# Patient Record
Sex: MALE | Race: WHITE | NOT HISPANIC OR LATINO | Employment: OTHER | URBAN - METROPOLITAN AREA
[De-identification: names, ages, dates, MRNs, and addresses within clinical notes are randomized per-mention and may not be internally consistent; named-entity substitution may affect disease eponyms.]

---

## 2017-01-20 ENCOUNTER — HOSPITAL ENCOUNTER (OUTPATIENT)
Dept: RADIOLOGY | Facility: HOSPITAL | Age: 72
Discharge: HOME | End: 2017-01-20
Attending: ORTHOPAEDIC SURGERY
Payer: MEDICARE

## 2017-01-20 DIAGNOSIS — M75.82 TENDINITIS OF LEFT ROTATOR CUFF: ICD-10-CM

## 2017-01-20 DIAGNOSIS — S49.90XA INJURY OF SHOULDER AND UPPER ARM: ICD-10-CM

## 2017-01-20 PROCEDURE — 73222 MRI JOINT UPR EXTREM W/DYE: CPT | Mod: 26,LT,, | Performed by: RADIOLOGY

## 2017-01-20 PROCEDURE — A9585 GADOBUTROL INJECTION: HCPCS | Performed by: ORTHOPAEDIC SURGERY

## 2017-01-20 PROCEDURE — 23350 INJECTION FOR SHOULDER X-RAY: CPT | Mod: ,,, | Performed by: RADIOLOGY

## 2017-01-20 PROCEDURE — 73040 CONTRAST X-RAY OF SHOULDER: CPT | Mod: ,,, | Performed by: RADIOLOGY

## 2017-01-20 PROCEDURE — 73222 MRI JOINT UPR EXTREM W/DYE: CPT | Mod: TC,LT

## 2017-01-20 PROCEDURE — 25500020 PHARM REV CODE 255: Performed by: ORTHOPAEDIC SURGERY

## 2017-01-20 PROCEDURE — 23350 INJECTION FOR SHOULDER X-RAY: CPT | Mod: TC

## 2017-01-20 RX ORDER — GADOBUTROL 604.72 MG/ML
1 INJECTION INTRAVENOUS
Status: COMPLETED | OUTPATIENT
Start: 2017-01-20 | End: 2017-01-20

## 2017-01-20 RX ADMIN — IOHEXOL 10 ML: 300 INJECTION, SOLUTION INTRAVENOUS at 09:01

## 2017-01-20 RX ADMIN — GADOBUTROL 1 ML: 604.72 INJECTION INTRAVENOUS at 09:01

## 2017-02-03 ENCOUNTER — HOSPITAL ENCOUNTER (OUTPATIENT)
Dept: RADIOLOGY | Facility: HOSPITAL | Age: 72
Discharge: HOME | End: 2017-02-03
Attending: ORTHOPAEDIC SURGERY
Payer: MEDICARE

## 2017-02-03 DIAGNOSIS — S83.282A ACUTE LATERAL MENISCUS TEAR OF LEFT KNEE: ICD-10-CM

## 2017-02-03 PROCEDURE — 73721 MRI JNT OF LWR EXTRE W/O DYE: CPT | Mod: TC,LT

## 2017-02-03 PROCEDURE — 73721 MRI JNT OF LWR EXTRE W/O DYE: CPT | Mod: 26,LT,, | Performed by: RADIOLOGY

## 2018-05-14 ENCOUNTER — TELEPHONE (OUTPATIENT)
Dept: NEUROLOGY | Facility: CLINIC | Age: 73
End: 2018-05-14

## 2018-05-14 NOTE — TELEPHONE ENCOUNTER
Left a message for the patient to let him know an appointment is scheduled for him on 5/29 at 2:00. The patient was asked to call back to reschedule if this is not a good day and time for him.

## 2018-05-29 ENCOUNTER — OFFICE VISIT (OUTPATIENT)
Dept: NEUROLOGY | Facility: CLINIC | Age: 73
End: 2018-05-29
Payer: MEDICARE

## 2018-05-29 VITALS
DIASTOLIC BLOOD PRESSURE: 67 MMHG | SYSTOLIC BLOOD PRESSURE: 110 MMHG | BODY MASS INDEX: 30.01 KG/M2 | WEIGHT: 221.56 LBS | HEART RATE: 66 BPM | HEIGHT: 72 IN

## 2018-05-29 DIAGNOSIS — R49.0 DYSPHONIA: Chronic | ICD-10-CM

## 2018-05-29 DIAGNOSIS — I25.118 CORONARY ARTERY DISEASE OF NATIVE ARTERY OF NATIVE HEART WITH STABLE ANGINA PECTORIS: Chronic | ICD-10-CM

## 2018-05-29 DIAGNOSIS — R13.12 OROPHARYNGEAL DYSPHAGIA: Chronic | ICD-10-CM

## 2018-05-29 DIAGNOSIS — E78.2 MIXED HYPERLIPIDEMIA: Chronic | ICD-10-CM

## 2018-05-29 DIAGNOSIS — F33.0 MILD EPISODE OF RECURRENT MAJOR DEPRESSIVE DISORDER: Chronic | ICD-10-CM

## 2018-05-29 DIAGNOSIS — I10 ESSENTIAL HYPERTENSION: Chronic | ICD-10-CM

## 2018-05-29 DIAGNOSIS — D47.2 MGUS (MONOCLONAL GAMMOPATHY OF UNKNOWN SIGNIFICANCE): Chronic | ICD-10-CM

## 2018-05-29 DIAGNOSIS — E11.9 TYPE 2 DIABETES MELLITUS WITHOUT COMPLICATION, WITHOUT LONG-TERM CURRENT USE OF INSULIN: Chronic | ICD-10-CM

## 2018-05-29 PROCEDURE — 99999 PR PBB SHADOW E&M-EST. PATIENT-LVL III: CPT | Mod: PBBFAC,,, | Performed by: PSYCHIATRY & NEUROLOGY

## 2018-05-29 PROCEDURE — 99213 OFFICE O/P EST LOW 20 MIN: CPT | Mod: PBBFAC | Performed by: PSYCHIATRY & NEUROLOGY

## 2018-05-29 PROCEDURE — 99205 OFFICE O/P NEW HI 60 MIN: CPT | Mod: S$PBB,,, | Performed by: PSYCHIATRY & NEUROLOGY

## 2018-05-29 RX ORDER — SERTRALINE HYDROCHLORIDE 100 MG/1
100 TABLET, FILM COATED ORAL DAILY
COMMUNITY
Start: 2018-04-11

## 2018-05-29 RX ORDER — ASPIRIN 81 MG/1
81 TABLET ORAL EVERY OTHER DAY
COMMUNITY

## 2018-05-29 RX ORDER — RANOLAZINE 500 MG/1
500 TABLET, EXTENDED RELEASE ORAL 2 TIMES DAILY
Status: ON HOLD | COMMUNITY
End: 2018-08-16 | Stop reason: HOSPADM

## 2018-05-29 RX ORDER — METFORMIN HYDROCHLORIDE 500 MG/1
500 TABLET ORAL 2 TIMES DAILY WITH MEALS
Status: ON HOLD | COMMUNITY
Start: 2018-03-16 | End: 2018-11-16 | Stop reason: SDUPTHER

## 2018-05-29 RX ORDER — EPINEPHRINE 0.22MG
100 AEROSOL WITH ADAPTER (ML) INHALATION DAILY
COMMUNITY

## 2018-05-29 RX ORDER — MELOXICAM 15 MG/1
TABLET ORAL
Status: ON HOLD | COMMUNITY
Start: 2018-04-11 | End: 2018-08-17 | Stop reason: HOSPADM

## 2018-05-29 RX ORDER — PANTOPRAZOLE SODIUM 40 MG/1
40 TABLET, DELAYED RELEASE ORAL DAILY
COMMUNITY
Start: 2018-03-27

## 2018-05-29 RX ORDER — HYDROCHLOROTHIAZIDE 12.5 MG/1
TABLET ORAL
Status: ON HOLD | COMMUNITY
Start: 2018-05-07 | End: 2018-08-16

## 2018-05-29 RX ORDER — VALSARTAN 160 MG/1
TABLET ORAL
Status: ON HOLD | COMMUNITY
Start: 2018-03-16 | End: 2018-08-16

## 2018-05-29 RX ORDER — TRAZODONE HYDROCHLORIDE 100 MG/1
100 TABLET ORAL NIGHTLY
Status: ON HOLD | COMMUNITY
End: 2018-08-24 | Stop reason: HOSPADM

## 2018-05-29 RX ORDER — ISOSORBIDE MONONITRATE 60 MG/1
TABLET, EXTENDED RELEASE ORAL
COMMUNITY
Start: 2018-05-07 | End: 2018-10-22

## 2018-05-29 RX ORDER — CLOPIDOGREL BISULFATE 75 MG/1
75 TABLET ORAL DAILY
Status: ON HOLD | COMMUNITY
End: 2018-08-23

## 2018-05-29 RX ORDER — LEVOTHYROXINE SODIUM 100 UG/1
100 TABLET ORAL DAILY
COMMUNITY
Start: 2018-04-15

## 2018-05-29 RX ORDER — SUCRALFATE 1 G/1
1 TABLET ORAL 4 TIMES DAILY PRN
COMMUNITY
Start: 2018-04-11

## 2018-05-29 RX ORDER — ATORVASTATIN CALCIUM 20 MG/1
20 TABLET, FILM COATED ORAL DAILY
COMMUNITY
End: 2018-10-22 | Stop reason: ALTCHOICE

## 2018-05-29 NOTE — LETTER
May 30, 2018      Hever Pena MD  [address]  [facility] [place] [address]           [facility]to [facility]  Neurology  [address]  [facility] [address]  Phone: [phone]  Fax: [phone]          Patient: Musa Lance   MR Number: [id number]   YOB: 1945   Date of Visit: 5/29/2018       Dear Dr. Hever Pena:    Thank you for referring Musa Lance to me for evaluation. Attached you will find relevant portions of my assessment and plan of care.    If you have questions, please do not hesitate to call me. I look forward to following Musa Lance along with you.    Sincerely,    Ricky Cantor MD    Enclosure  CC:  No Recipients    If you would like to receive this communication electronically, please contact [email or web address] or [phone] to request more information on [employer] Link access.    For providers and/or their staff who would like to refer a patient to [facility], please contact us through our one-stop-shop provider referral line, [facility], at [phone].    If you feel you have received this communication in error or would no longer like to receive these types of communications, please e-mail [email or web address]

## 2018-05-30 PROBLEM — R49.0 DYSPHONIA: Chronic | Status: ACTIVE | Noted: 2018-05-30

## 2018-05-30 PROBLEM — E78.2 MIXED HYPERLIPIDEMIA: Chronic | Status: ACTIVE | Noted: 2018-05-30

## 2018-05-30 PROBLEM — E11.9 TYPE 2 DIABETES MELLITUS WITHOUT COMPLICATION, WITHOUT LONG-TERM CURRENT USE OF INSULIN: Chronic | Status: ACTIVE | Noted: 2018-05-30

## 2018-05-30 PROBLEM — D47.2 MGUS (MONOCLONAL GAMMOPATHY OF UNKNOWN SIGNIFICANCE): Chronic | Status: ACTIVE | Noted: 2018-05-30

## 2018-05-30 PROBLEM — R13.12 OROPHARYNGEAL DYSPHAGIA: Chronic | Status: ACTIVE | Noted: 2018-05-30

## 2018-05-30 PROBLEM — I10 ESSENTIAL HYPERTENSION: Chronic | Status: ACTIVE | Noted: 2018-05-30

## 2018-05-30 PROBLEM — I25.118 CORONARY ARTERY DISEASE OF NATIVE ARTERY OF NATIVE HEART WITH STABLE ANGINA PECTORIS: Chronic | Status: ACTIVE | Noted: 2018-05-30

## 2018-05-30 PROBLEM — F33.0 MILD EPISODE OF RECURRENT MAJOR DEPRESSIVE DISORDER: Chronic | Status: ACTIVE | Noted: 2018-05-30

## 2018-05-31 NOTE — ASSESSMENT & PLAN NOTE
On aspirin and Plavix and ranexa, managed by PCP. Patient correlates onset of dysphonia with start of ranexa, but I can find not relevant case reports.

## 2018-05-31 NOTE — ASSESSMENT & PLAN NOTE
Mild elevation in free kappa light chains, which is associated with an increase in chances for multiple myeloma when compared to other gammopathies.   - He has a planned follow up with hematology

## 2018-05-31 NOTE — ASSESSMENT & PLAN NOTE
Per recent barium swallow evaluation in [place], there is oropharyngeal dysphagia with concern for esophageal dysfunction. Functional Oral Intake Scale rating 6 of 7. Uncertain etiology.   - Referral to Dr. Cooper was offered and encouraged, though patient declined.

## 2018-05-31 NOTE — ASSESSMENT & PLAN NOTE
Onset about 9 months ago and does not fluctuate throughout the day. He has seen his ENT (Rahul in [place]) three times (last in February 2018) and has been scoped twice without revealing pathology aside from lingual tonsil hypertrophy and diagnosis of coincident acute pharyngitis.   - Referral to Dr. Cooper was offered but patient declined.

## 2018-05-31 NOTE — PROGRESS NOTES
Subjective:     Chief Complaint:  Consult      History of Present Illness:  Musa Lance is a 73 y.o. male who presents today for initial evaluation for suspected CIDP per referral notes. Sent here by Dr. Pena. Patient reports persistent dysphonia and dysphagia (liquids>solids) over the previous 9 months without any fluctuating severity in complaints. There has been some progressive worsening and no apparent origin. He has been evaluated multiple times by his ENT Dr. Kay in [place]. He has had EMG per Dr. Pena of the SCM and genioglossus muscles, which revealed no spontaneous activity. 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 There were no reduced conduction velocities or evidence of conduction block that would be suggestive of CIDP. There was chronic right L5 radiculopathy seen on EMG.     He denies any extremity weaknesses or new sensory deficits. There is some occasional perioral numbness but this is chronic. There is no complaint of foot drop or change in gait, though he does report diminished exercise tolerance and he has curtailed fishing and hunting activities because of fatigue. Weight has been stable, appetite is good. No orthopnea. No problems with fine motor skills. No diplopia or ptosis. Routine age-appropriate cancer screening is up to date.    No family neurological diseases. His daughter has lupus.    Recent MRI Brain (4/3/2018) showed CWMD and remote lacunar infarcts in the basal ganglia bilaterally.    He was recommended to get CSF studies, but declined.    SPEP with Immunofixation was normal, normal immunofixation pattern.  Mildly elevated Free Kappa light chains 23.6 mg/L (3.3-19.4)  B12 = 574        Review of Systems  Review of Systems   Constitutional: Positive for activity change and fatigue. Negative for appetite change, fever and unexpected weight change.   HENT: Positive for  sore throat, trouble swallowing and voice change. Negative for drooling, facial swelling, hearing loss, postnasal drip and sinus pressure.    Eyes: Negative for pain, redness and visual disturbance.   Respiratory: Negative for choking, chest tightness and shortness of breath.    Cardiovascular: Positive for chest pain.   Gastrointestinal: Negative for abdominal pain, nausea and vomiting.   Endocrine: Negative for cold intolerance.   Genitourinary: Negative for frequency.   Musculoskeletal: Negative for arthralgias, back pain, gait problem, joint swelling, myalgias and neck pain.   Skin: Negative for color change.   Allergic/Immunologic: Negative for immunocompromised state.   Neurological: Positive for numbness and headaches. Negative for dizziness, seizures, speech difficulty and weakness.   Hematological: Negative for adenopathy.   Psychiatric/Behavioral: Negative for agitation, behavioral problems, dysphoric mood and suicidal ideas.        Objective:     Vitals:    05/29/18 1423   BP: 110/67   Pulse: 66   Weight: 100.5 kg (221 lb 9 oz)   Height: 6' (1.829 m)   PainSc: 0-No pain       Neurologic Exam     Mental Status   Oriented to person, place, and time.   Attention: normal.   Speech: (Mild-moderately Dysphonic)  Level of consciousness: alert  Knowledge: good.     Cranial Nerves     CN II   Visual fields full to confrontation.     CN III, IV, VI   Pupils are equal, round, and reactive to light.  Extraocular motions are normal.     CN V   Facial sensation intact.     CN VII   Facial expression full, symmetric.     CN VIII   Hearing: intact    CN IX, X   CN IX normal.     CN XI   CN XI normal.     CN XII   CN XII normal.   Tongue: not atrophic  Fasciculations: absent  Tongue deviation: none  5/5 lateral tongue strength both sides     Motor Exam   Muscle bulk: normal  Overall muscle tone: normal    Strength   Strength 5/5 throughout.     Sensory Exam   Right leg vibration: decreased from ankle  Left leg vibration:  decreased from ankle  Right leg pinprick: decreased from ankle  Left leg pinprick: decreased from ankle    Gait, Coordination, and Reflexes     Gait  Gait: normal    Tremor   Resting tremor: absent  Intention tremor: absent  Action tremor: absent    Reflexes   Right brachioradialis: 2+  Left brachioradialis: 2+  Right biceps: 2+  Left biceps: 2+  Right triceps: 2+  Left triceps: 2+  Right patellar: 2+  Left patellar: 2+  Right achilles: 1+  Left achilles: 1+  Right plantar: normal  Left plantar: normal  Right Larsen: absent  Left Larsen: absent      Physical Exam   Constitutional: He is oriented to person, place, and time. He appears well-developed and well-nourished.   HENT:   Head: Normocephalic and atraumatic.   Eyes: EOM are normal. Pupils are equal, round, and reactive to light.   Neck: Normal range of motion. Neck supple. No thyromegaly present.   Cardiovascular: Normal rate.    Pulmonary/Chest: Effort normal.   Abdominal: Soft.   Lymphadenopathy:     He has no cervical adenopathy.   Neurological: He is oriented to person, place, and time. He has normal strength. Gait normal.   Reflex Scores:       Tricep reflexes are 2+ on the right side and 2+ on the left side.       Bicep reflexes are 2+ on the right side and 2+ on the left side.       Brachioradialis reflexes are 2+ on the right side and 2+ on the left side.       Patellar reflexes are 2+ on the right side and 2+ on the left side.       Achilles reflexes are 1+ on the right side and 1+ on the left side.  Skin: Skin is warm and dry.   Psychiatric: He has a normal mood and affect. His behavior is normal. Thought content normal.   Vitals reviewed.        No results found for: WBC, HGB, HCT, PLT, CHOL, TRIG, HDL, LDLDIRECT, ALT, AST, NA, K, CL, CREATININE, BUN, CO2, TSH, HGBA1C, [id number], VITAMINB6, VITAMINB1      Assessment and Plan:     Problem List Items Addressed This Visit     Dysphonia (Chronic)    Current Assessment & Plan     Onset about 9 months  ago and does not fluctuate throughout the day. He has seen his ENT (Rahul in [place]) three times (last in February 2018) and has been scoped twice without revealing pathology aside from lingual tonsil hypertrophy and diagnosis of coincident acute pharyngitis.   - Referral to Dr. Cooper was offered but patient declined.         Oropharyngeal dysphagia (Chronic)    Current Assessment & Plan     Per recent barium swallow evaluation in [place], there is oropharyngeal dysphagia with concern for esophageal dysfunction. Functional Oral Intake Scale rating 6 of 7. Uncertain etiology.   - Referral to Dr. Cooper was offered and encouraged, though patient declined.         Mixed hyperlipidemia (Chronic)    Current Assessment & Plan     On appropriate medications and managed by PCP. We discussed the importance of managing all secondary stroke risk factors, including hyperlipidemia.           Essential hypertension (Chronic)    Current Assessment & Plan     On appropriate medications and managed by PCP. We discussed the importance of managing all secondary stroke risk factors, including hypertension.           Type 2 diabetes mellitus without complication, without long-term current use of insulin (Chronic)    Current Assessment & Plan     On appropriate medications and managed by PCP. We discussed the importance of managing all secondary stroke risk factors, including DM2.           Mild episode of recurrent major depressive disorder (Chronic)    Current Assessment & Plan     On Zoloft, managed by PCP.         Coronary artery disease of native artery of native heart with stable angina pectoris (Chronic)    Current Assessment & Plan     On aspirin and Plavix and ranexa, managed by PCP. Patient correlates onset of dysphonia with start of ranexa, but I can find not relevant case reports.          MGUS (monoclonal gammopathy of unknown significance) (Chronic)    Current Assessment & Plan     Mild elevation in free kappa light  chains, which is associated with an increase in chances for multiple myeloma when compared to other gammopathies.   - He has a planned follow up with hematology             I see no evidence of CIDP on my physical examination and EDX studies done recently per Dr. Pena are inconsistent with CIDP. The patient was also told by one of his MDs that he may have ALS, though I see no convincing evidence at this time of motor neuron disease on my neurological examination nor in the EDX procedures done. Reason for dysphonia and dysphagia is uncertain at this time. I would like for Mr. Lance to see Roberto Cooper here at [facility], though the patient prefers to see a second ENT closer to home for now. He can call me and ask for referral at any time. My card was provided.    Ricky Cantor MD  [facility]

## 2018-08-16 PROBLEM — I20.9 ANGINA, CLASS III: Status: ACTIVE | Noted: 2018-08-16

## 2018-08-17 PROBLEM — I20.9 ANGINA, CLASS III: Status: RESOLVED | Noted: 2018-08-16 | Resolved: 2018-08-17

## 2018-08-20 PROBLEM — I25.10 CORONARY ARTERY DISEASE: Status: ACTIVE | Noted: 2018-08-20

## 2018-08-24 PROBLEM — R13.12 OROPHARYNGEAL DYSPHAGIA: Chronic | Status: RESOLVED | Noted: 2018-05-30 | Resolved: 2018-08-24

## 2018-11-15 PROBLEM — I25.10 CAD (CORONARY ARTERY DISEASE): Status: ACTIVE | Noted: 2018-11-15

## 2020-02-26 PROBLEM — R91.8 LUNG FIELD ABNORMAL: Status: ACTIVE | Noted: 2020-02-26

## 2020-02-26 PROBLEM — R93.89 ABNORMAL CHEST CT: Status: ACTIVE | Noted: 2020-02-26

## 2021-01-10 ENCOUNTER — IMMUNIZATION (OUTPATIENT)
Dept: INTERNAL MEDICINE | Facility: CLINIC | Age: 76
End: 2021-01-10
Payer: MEDICARE

## 2021-01-10 DIAGNOSIS — Z23 NEED FOR VACCINATION: ICD-10-CM

## 2021-01-10 PROCEDURE — 91300 COVID-19, MRNA, LNP-S, PF, 30 MCG/0.3 ML DOSE VACCINE: CPT | Mod: PBBFAC

## 2021-02-01 ENCOUNTER — IMMUNIZATION (OUTPATIENT)
Dept: INTERNAL MEDICINE | Facility: CLINIC | Age: 76
End: 2021-02-01
Payer: MEDICARE

## 2021-02-01 DIAGNOSIS — Z23 NEED FOR VACCINATION: Primary | ICD-10-CM

## 2021-02-01 PROCEDURE — 0002A PR IMMUNIZ ADMIN, SARS-COV-2 COVID-19 VACC, 30MCG/0.3ML, 2ND DOSE: CPT | Mod: PBBFAC

## 2021-02-01 PROCEDURE — 91300 PR SARS-COV- 2 COVID-19 VACCINE, NO PRSV, 30MCG/0.3ML, IM: CPT | Mod: PBBFAC

## 2021-02-01 RX ADMIN — RNA INGREDIENT BNT-162B2 0.3 ML: 0.23 INJECTION, SUSPENSION INTRAMUSCULAR at 07:02

## 2023-11-13 DIAGNOSIS — R06.02 SHORTNESS OF BREATH: Primary | ICD-10-CM

## 2023-11-14 ENCOUNTER — HOSPITAL ENCOUNTER (OUTPATIENT)
Dept: PULMONOLOGY | Facility: HOSPITAL | Age: 78
Discharge: HOME | End: 2023-11-14
Attending: INTERNAL MEDICINE
Payer: MEDICARE

## 2023-11-14 DIAGNOSIS — R06.02 SHORTNESS OF BREATH: ICD-10-CM

## 2023-11-14 LAB
BRPFT: ABNORMAL
DLCO SINGLE BREATH LLN: 20.56
DLCO SINGLE BREATH PRE REF: 47.7 %
DLCO SINGLE BREATH REF: 27.49
DLCOC SBVA LLN: 2.57
DLCOC SBVA REF: 3.65
DLCOC SINGLE BREATH LLN: 20.56
DLCOC SINGLE BREATH REF: 27.49
DLCOVA LLN: 2.57
DLCOVA PRE REF: 73.1 %
DLCOVA PRE: 2.67 ML/(MIN*MMHG*L) (ref 2.57–4.73)
DLCOVA REF: 3.65
ERVN2 LLN: -16448.98
ERVN2 PRE REF: 92.3 %
ERVN2 PRE: 0.94 L (ref -16448.98–16451.02)
ERVN2 REF: 1.02
FEF 25 75 CHG: 10.3 %
FEF 25 75 LLN: 0.87
FEF 25 75 POST REF: 143.8 %
FEF 25 75 PRE REF: 130.3 %
FEF 25 75 REF: 2.21
FET100 CHG: 3.8 %
FEV1 CHG: 1.6 %
FEV1 FVC CHG: 2.6 %
FEV1 FVC LLN: 60
FEV1 FVC POST REF: 109.4 %
FEV1 FVC PRE REF: 106.6 %
FEV1 FVC REF: 75
FEV1 LLN: 2.19
FEV1 POST REF: 96.2 %
FEV1 PRE REF: 94.7 %
FEV1 REF: 3.14
FRCN2 LLN: 2.9
FRCN2 PRE REF: 65.9 %
FRCN2 REF: 3.88
FVC CHG: -1 %
FVC LLN: 3.08
FVC POST REF: 87.2 %
FVC PRE REF: 88.1 %
FVC REF: 4.25
IVC PRE: 3.29 L (ref 3.08–5.43)
IVC SINGLE BREATH LLN: 3.08
IVC SINGLE BREATH PRE REF: 77.6 %
IVC SINGLE BREATH REF: 4.25
MEP LLN: 83
MEP PRE REF: 81.2 %
MEP PRE: 81.23 CMH2O (ref 83.23–116.78)
MEP REF: 100
MIP LLN: 58
MIP PRE REF: 93 %
MIP PRE: 69.78 CMH2O (ref 58.23–91.78)
MIP REF: 75
MVV LLN: 107
MVV PRE REF: 100.8 %
MVV REF: 126
PEF CHG: 20.7 %
PEF LLN: 5.61
PEF POST REF: 111.7 %
PEF PRE REF: 92.5 %
PEF REF: 8.06
POST FEF 25 75: 3.18 L/S (ref 0.87–4.18)
POST FET 100: 6.27 SEC
POST FEV1 FVC: 81.62 % (ref 60.19–87.55)
POST FEV1: 3.02 L (ref 2.19–4.01)
POST FVC: 3.7 L (ref 3.08–5.43)
POST PEF: 9 L/S (ref 5.61–10.51)
PRE DLCO: 13.11 ML/(MIN*MMHG) (ref 20.56–34.42)
PRE FEF 25 75: 2.88 L/S (ref 0.87–4.18)
PRE FET 100: 6.04 SEC
PRE FEV1 FVC: 79.53 % (ref 60.19–87.55)
PRE FEV1: 2.97 L (ref 2.19–4.01)
PRE FRC N2: 2.56 L (ref 2.9–4.87)
PRE FVC: 3.74 L (ref 3.08–5.43)
PRE MVV: 126.59 L/MIN (ref 106.79–144.48)
PRE PEF: 7.45 L/S (ref 5.61–10.51)
RVN2 LLN: 2.19
RVN2 PRE REF: 56.5 %
RVN2 PRE: 1.62 L (ref 2.19–3.53)
RVN2 REF: 2.86
RVN2TLCN2 LLN: 35.01
RVN2TLCN2 PRE REF: 68.1 %
RVN2TLCN2 PRE: 29.96 % (ref 35.01–52.97)
RVN2TLCN2 REF: 43.99
TLCN2 LLN: 6.38
TLCN2 PRE REF: 71.6 %
TLCN2 PRE: 5.39 L (ref 6.38–8.68)
TLCN2 REF: 7.53
VA PRE: 4.91 L (ref 7.38–7.38)
VA SINGLE BREATH LLN: 7.38
VA SINGLE BREATH PRE REF: 66.6 %
VA SINGLE BREATH REF: 7.38
VCMAXN2 LLN: 3.08
VCMAXN2 PRE REF: 89 %
VCMAXN2 PRE: 3.78 L (ref 3.08–5.43)
VCMAXN2 REF: 4.25

## 2023-11-14 PROCEDURE — 94727 PR PULM FUNCTION TEST BY GAS: ICD-10-PCS | Mod: 26,,, | Performed by: INTERNAL MEDICINE

## 2023-11-14 PROCEDURE — 94799 PR NIF/PIF PULMONARY FUNCTION TEST: ICD-10-PCS | Mod: 26,,, | Performed by: INTERNAL MEDICINE

## 2023-11-14 PROCEDURE — 94729 PR C02/MEMBANE DIFFUSE CAPACITY: ICD-10-PCS | Mod: 26,,, | Performed by: INTERNAL MEDICINE

## 2023-11-14 PROCEDURE — 94727 GAS DIL/WSHOT DETER LNG VOL: CPT | Mod: 26,,, | Performed by: INTERNAL MEDICINE

## 2023-11-14 PROCEDURE — 94060 PR EVAL OF BRONCHOSPASM: ICD-10-PCS | Mod: 26,,, | Performed by: INTERNAL MEDICINE

## 2023-11-14 PROCEDURE — 94727 GAS DIL/WSHOT DETER LNG VOL: CPT

## 2023-11-14 PROCEDURE — 94729 DIFFUSING CAPACITY: CPT

## 2023-11-14 PROCEDURE — 94060 EVALUATION OF WHEEZING: CPT | Mod: 26,,, | Performed by: INTERNAL MEDICINE

## 2023-11-14 PROCEDURE — 94060 EVALUATION OF WHEEZING: CPT

## 2023-11-14 PROCEDURE — 27100098 HC SPACER

## 2023-11-14 PROCEDURE — 94799 UNLISTED PULMONARY SVC/PX: CPT | Mod: 26,,, | Performed by: INTERNAL MEDICINE

## 2023-11-14 PROCEDURE — 99900031 HC PATIENT EDUCATION (STAT)

## 2023-11-14 PROCEDURE — 99900035 HC TECH TIME PER 15 MIN (STAT)

## 2023-11-14 PROCEDURE — 94729 DIFFUSING CAPACITY: CPT | Mod: 26,,, | Performed by: INTERNAL MEDICINE
